# Patient Record
Sex: MALE | URBAN - METROPOLITAN AREA
[De-identification: names, ages, dates, MRNs, and addresses within clinical notes are randomized per-mention and may not be internally consistent; named-entity substitution may affect disease eponyms.]

---

## 2023-09-27 ENCOUNTER — TELEPHONE ENCOUNTER (OUTPATIENT)
Dept: URBAN - METROPOLITAN AREA CLINIC 33 | Facility: CLINIC | Age: 58
End: 2023-09-27

## 2023-09-27 ENCOUNTER — OFFICE VISIT (OUTPATIENT)
Dept: URBAN - METROPOLITAN AREA CLINIC 33 | Facility: CLINIC | Age: 58
End: 2023-09-27
Payer: MEDICARE

## 2023-09-27 VITALS
SYSTOLIC BLOOD PRESSURE: 140 MMHG | HEIGHT: 70 IN | WEIGHT: 110 LBS | DIASTOLIC BLOOD PRESSURE: 82 MMHG | BODY MASS INDEX: 15.75 KG/M2

## 2023-09-27 DIAGNOSIS — R19.7 DIARRHEA, UNSPECIFIED TYPE: ICD-10-CM

## 2023-09-27 DIAGNOSIS — R10.32 LEFT LOWER QUADRANT ABDOMINAL PAIN: ICD-10-CM

## 2023-09-27 DIAGNOSIS — R14.1 GAS PAIN: ICD-10-CM

## 2023-09-27 DIAGNOSIS — R63.4 WEIGHT LOSS: ICD-10-CM

## 2023-09-27 PROCEDURE — 99203 OFFICE O/P NEW LOW 30 MIN: CPT | Performed by: PHYSICIAN ASSISTANT

## 2023-09-27 RX ORDER — TAMSULOSIN HYDROCHLORIDE 0.4 MG/1
1 CAPSULE CAPSULE ORAL ONCE A DAY
Status: ACTIVE | COMMUNITY

## 2023-09-27 RX ORDER — PREGABALIN 100 MG/1
1 CAPSULE CAPSULE ORAL ONCE A DAY
Status: ACTIVE | COMMUNITY

## 2023-09-27 RX ORDER — SODIUM PICOSULFATE, MAGNESIUM OXIDE, AND ANHYDROUS CITRIC ACID 10; 3.5; 12 MG/160ML; G/160ML; G/160ML
AS DIRECTED ML LIQUID ORAL
Qty: 1 | Refills: 0 | OUTPATIENT
Start: 2023-09-27 | End: 2023-09-29

## 2023-09-27 RX ORDER — LISINOPRIL AND HYDROCHLOROTHIAZIDE 20; 12.5 MG/1; MG/1
1 TABLET TABLET ORAL ONCE A DAY
Status: ACTIVE | COMMUNITY

## 2023-09-27 NOTE — HPI-WEIGHT LOSS
58 year old male patient presents today for weight loss. He admits he has lost about 25 pounds unintentionally in the last year.  No changes in his diet.  He denies early satiety.  Rare heartburn, denies dysphagia, N/V. Occasional gas.  Admits he eats throughout the day. Admits abdominal soreness in the LLQ for the past six to eight months, only hurts when he palpates the area. Admits to oily stools over past 2 years. Admits 4-5 bowel movements per day that are either semi-formed or formed.

## 2023-09-27 NOTE — PHYSICAL EXAM GASTROINTESTINAL
Abdomen,  soft, +tender LLQ, nondistended,  normal bowel sounds,  Liver and Spleen,  no hepatomegaly present

## 2023-10-25 ENCOUNTER — CLAIMS CREATED FROM THE CLAIM WINDOW (OUTPATIENT)
Dept: URBAN - METROPOLITAN AREA SURGERY CENTER 8 | Facility: SURGERY CENTER | Age: 58
End: 2023-10-25

## 2023-10-25 ENCOUNTER — CLAIMS CREATED FROM THE CLAIM WINDOW (OUTPATIENT)
Dept: URBAN - METROPOLITAN AREA SURGERY CENTER 8 | Facility: SURGERY CENTER | Age: 58
End: 2023-10-25
Payer: MEDICARE

## 2023-10-25 ENCOUNTER — CLAIMS CREATED FROM THE CLAIM WINDOW (OUTPATIENT)
Dept: URBAN - METROPOLITAN AREA CLINIC 4 | Facility: CLINIC | Age: 58
End: 2023-10-25
Payer: MEDICARE

## 2023-10-25 DIAGNOSIS — K31.89 OTHER DISEASES OF STOMACH AND DUODENUM: ICD-10-CM

## 2023-10-25 DIAGNOSIS — R63.4 WEIGHT LOSS: ICD-10-CM

## 2023-10-25 DIAGNOSIS — K31.89 ACHYLIA: ICD-10-CM

## 2023-10-25 DIAGNOSIS — K63.5 BENIGN COLON POLYP: ICD-10-CM

## 2023-10-25 DIAGNOSIS — K63.89 APPENDICITIS EPIPLOICA: ICD-10-CM

## 2023-10-25 DIAGNOSIS — K64.0 GRADE I INTERNAL HEMORRHOIDS: ICD-10-CM

## 2023-10-25 DIAGNOSIS — K44.9 HIATAL HERNIA: ICD-10-CM

## 2023-10-25 DIAGNOSIS — R63.4 ABNORMAL INTENTIONAL WEIGHT LOSS: ICD-10-CM

## 2023-10-25 DIAGNOSIS — D12.8 ADENOMATOUS POLYP OF RECTUM: ICD-10-CM

## 2023-10-25 DIAGNOSIS — Z98.0 H/O BILLROTH II OPERATION: ICD-10-CM

## 2023-10-25 DIAGNOSIS — R68.89 ERYTHEMATOUS MUCOSA: ICD-10-CM

## 2023-10-25 PROCEDURE — 88305 TISSUE EXAM BY PATHOLOGIST: CPT | Performed by: PATHOLOGY

## 2023-10-25 PROCEDURE — 88312 SPECIAL STAINS GROUP 1: CPT | Performed by: PATHOLOGY

## 2023-10-25 PROCEDURE — 43239 EGD BIOPSY SINGLE/MULTIPLE: CPT | Performed by: INTERNAL MEDICINE

## 2023-10-25 PROCEDURE — 45385 COLONOSCOPY W/LESION REMOVAL: CPT | Performed by: INTERNAL MEDICINE

## 2023-10-25 PROCEDURE — 45380 COLONOSCOPY AND BIOPSY: CPT | Performed by: INTERNAL MEDICINE

## 2023-10-25 PROCEDURE — 00813 ANES UPR LWR GI NDSC PX: CPT | Performed by: NURSE ANESTHETIST, CERTIFIED REGISTERED

## 2023-10-25 PROCEDURE — G8907 PT DOC NO EVENTS ON DISCHARG: HCPCS | Performed by: INTERNAL MEDICINE

## 2023-10-25 RX ORDER — PREGABALIN 100 MG/1
1 CAPSULE CAPSULE ORAL ONCE A DAY
Status: ACTIVE | COMMUNITY

## 2023-10-25 RX ORDER — TAMSULOSIN HYDROCHLORIDE 0.4 MG/1
1 CAPSULE CAPSULE ORAL ONCE A DAY
Status: ACTIVE | COMMUNITY

## 2023-10-25 RX ORDER — LISINOPRIL AND HYDROCHLOROTHIAZIDE 20; 12.5 MG/1; MG/1
1 TABLET TABLET ORAL ONCE A DAY
Status: ACTIVE | COMMUNITY

## 2023-10-26 ENCOUNTER — TELEPHONE ENCOUNTER (OUTPATIENT)
Dept: URBAN - METROPOLITAN AREA CLINIC 35 | Facility: CLINIC | Age: 58
End: 2023-10-26

## 2023-11-15 ENCOUNTER — OFFICE VISIT (OUTPATIENT)
Dept: URBAN - METROPOLITAN AREA CLINIC 33 | Facility: CLINIC | Age: 58
End: 2023-11-15

## 2023-11-20 ENCOUNTER — OFFICE VISIT (OUTPATIENT)
Dept: URBAN - METROPOLITAN AREA CLINIC 33 | Facility: CLINIC | Age: 58
End: 2023-11-20

## 2023-11-20 ENCOUNTER — TELEPHONE ENCOUNTER (OUTPATIENT)
Dept: URBAN - METROPOLITAN AREA CLINIC 35 | Facility: CLINIC | Age: 58
End: 2023-11-20

## 2023-12-07 ENCOUNTER — OFFICE VISIT (OUTPATIENT)
Dept: URBAN - METROPOLITAN AREA CLINIC 35 | Facility: CLINIC | Age: 58
End: 2023-12-07

## 2024-02-05 ENCOUNTER — OV EP (OUTPATIENT)
Dept: URBAN - METROPOLITAN AREA CLINIC 33 | Facility: CLINIC | Age: 59
End: 2024-02-05
Payer: MEDICARE

## 2024-02-05 VITALS
OXYGEN SATURATION: 97 % | SYSTOLIC BLOOD PRESSURE: 160 MMHG | BODY MASS INDEX: 16.61 KG/M2 | HEIGHT: 70 IN | HEART RATE: 83 BPM | DIASTOLIC BLOOD PRESSURE: 84 MMHG | WEIGHT: 116 LBS

## 2024-02-05 DIAGNOSIS — K86.81 EXOCRINE PANCREATIC INSUFFICIENCY: ICD-10-CM

## 2024-02-05 DIAGNOSIS — R63.4 WEIGHT LOSS: ICD-10-CM

## 2024-02-05 DIAGNOSIS — K64.8 INTERNAL HEMORRHOIDS: ICD-10-CM

## 2024-02-05 DIAGNOSIS — R10.32 LEFT LOWER QUADRANT ABDOMINAL PAIN: ICD-10-CM

## 2024-02-05 PROBLEM — 88111009: Status: ACTIVE | Noted: 2024-02-05

## 2024-02-05 PROBLEM — 45979003: Status: ACTIVE | Noted: 2024-02-05

## 2024-02-05 PROBLEM — 47367009: Status: ACTIVE | Noted: 2024-02-05

## 2024-02-05 PROBLEM — 89362005: Status: ACTIVE | Noted: 2024-02-05

## 2024-02-05 PROBLEM — 301716002: Status: ACTIVE | Noted: 2024-02-05

## 2024-02-05 PROCEDURE — 99214 OFFICE O/P EST MOD 30 MIN: CPT | Performed by: INTERNAL MEDICINE

## 2024-02-05 RX ORDER — LISINOPRIL AND HYDROCHLOROTHIAZIDE 20; 12.5 MG/1; MG/1
1 TABLET TABLET ORAL ONCE A DAY
Status: ACTIVE | COMMUNITY

## 2024-02-05 RX ORDER — TAMSULOSIN HYDROCHLORIDE 0.4 MG/1
1 CAPSULE CAPSULE ORAL ONCE A DAY
Status: ACTIVE | COMMUNITY

## 2024-02-05 RX ORDER — PREGABALIN 100 MG/1
1 CAPSULE CAPSULE ORAL ONCE A DAY
Status: ACTIVE | COMMUNITY

## 2024-02-05 RX ORDER — PANCRELIPASE 36000; 180000; 114000 [USP'U]/1; [USP'U]/1; [USP'U]/1
ONE CAPSULE WITH SNACKS AND 2 WITH MEALS CAPSULE, DELAYED RELEASE PELLETS ORAL
Qty: 720 | Refills: 3 | OUTPATIENT
Start: 2024-02-05 | End: 2024-10-02

## 2024-02-05 NOTE — HPI-WEIGHT LOSS
Patient admits improvement with his weight loss symptoms ( he has gained six lbs since his last visit). On his last visit patient weighed in at 110lbs. Today patient weighed in at 116 lbs gaining a total of 6 lbs. He states his appetite is well. Patient admits consuming at least four to five meals per day. He reports no associated symptoms at this time.  Most recent labs completed on 12.14.2023 revealed Glucose: 106H, all other labs were normal.   (Last Visit 09.27.2023) 58 year old male patient presents today for weight loss. He admits he has lost about 25 pounds unintentionally in the last year.  No changes in his diet.  He denies early satiety.  Rare heartburn, denies dysphagia, N/V. Occasional gas.  Admits he eats throughout the day. Admits abdominal soreness in the LLQ for the past six to eight months, only hurts when he palpates the area. Admits to oily stools over past 2 years. Admits 4-5 bowel movements per day that are either semi-formed or formed.

## 2024-02-05 NOTE — HPI-COLONOSCOPY FOLLOWUP
Patient presents today for a follow up from his colonoscopy. He denies any complications after his procedure. Currently reports at least four to five bowel movements per day, without strain. His stools are formed to aquilino-formed without the presence of blood, mucus, and melena. He denies any pruritus ani or rectal pain.  Patient stated that he DID NOT get his CT completed

## 2024-02-05 NOTE — HPI-ENDOSCOPY (EGD) FOLLOWUP
He denies any complications after his procedure. Since his procedure he denies any episodes of dysphagia, globus, a change in appetite or bowel habits.

## 2025-07-14 NOTE — PHYSICAL EXAM PSYCH:
Currently denies any chest pain.  Is having increasing shortness of breath leg edema and weight gain.  Was seen [facility] cardiologist but mainly remotely we will get him scheduled with cardiology in town          normal mood with appropriate affect, cognitive function intact, speech clear